# Patient Record
Sex: FEMALE
[De-identification: names, ages, dates, MRNs, and addresses within clinical notes are randomized per-mention and may not be internally consistent; named-entity substitution may affect disease eponyms.]

---

## 2019-06-11 PROBLEM — Z00.00 ENCOUNTER FOR PREVENTIVE HEALTH EXAMINATION: Status: ACTIVE | Noted: 2019-06-11

## 2019-06-12 ENCOUNTER — APPOINTMENT (OUTPATIENT)
Dept: ENDOCRINOLOGY | Facility: CLINIC | Age: 59
End: 2019-06-12
Payer: COMMERCIAL

## 2019-06-12 VITALS
DIASTOLIC BLOOD PRESSURE: 76 MMHG | HEIGHT: 63 IN | BODY MASS INDEX: 21.97 KG/M2 | SYSTOLIC BLOOD PRESSURE: 116 MMHG | WEIGHT: 124 LBS | HEART RATE: 66 BPM

## 2019-06-12 DIAGNOSIS — F17.200 NICOTINE DEPENDENCE, UNSPECIFIED, UNCOMPLICATED: ICD-10-CM

## 2019-06-12 DIAGNOSIS — Z72.0 TOBACCO USE: ICD-10-CM

## 2019-06-12 DIAGNOSIS — M80.80XA OTHER OSTEOPOROSIS WITH CURRENT PATHOLOGICAL FRACTURE, UNSPECIFIED SITE, INITIAL ENCOUNTER FOR FRACTURE: ICD-10-CM

## 2019-06-12 DIAGNOSIS — Z78.9 OTHER SPECIFIED HEALTH STATUS: ICD-10-CM

## 2019-06-12 DIAGNOSIS — E03.9 HYPOTHYROIDISM, UNSPECIFIED: ICD-10-CM

## 2019-06-12 PROCEDURE — 99205 OFFICE O/P NEW HI 60 MIN: CPT | Mod: 25

## 2019-06-12 PROCEDURE — 99407 BEHAV CHNG SMOKING > 10 MIN: CPT

## 2019-06-12 RX ORDER — NICOTINE 14 MG/24H
14 PATCH, EXTENDED RELEASE TRANSDERMAL DAILY
Qty: 3 | Refills: 3 | Status: ACTIVE | COMMUNITY
Start: 2019-06-12 | End: 1900-01-01

## 2019-06-13 PROBLEM — E03.9 ADULT HYPOTHYROIDISM: Status: ACTIVE | Noted: 2019-06-13

## 2019-06-13 PROBLEM — Z78.9 ALCOHOL CONSUMPTION ONE TO TWO DAYS PER WEEK: Status: ACTIVE | Noted: 2019-06-13

## 2019-06-13 PROBLEM — F17.200 CURRENT EVERY DAY SMOKER: Status: ACTIVE | Noted: 2019-06-13

## 2019-06-13 PROBLEM — M80.80XA OSTEOPOROSIS WITH FRACTURE: Status: ACTIVE | Noted: 2019-06-13

## 2019-06-13 RX ORDER — LEVOTHYROXINE SODIUM 125 UG/1
125 TABLET ORAL
Qty: 90 | Refills: 0 | Status: ACTIVE | COMMUNITY
Start: 2019-05-31

## 2019-06-13 RX ORDER — SIMVASTATIN 20 MG/1
20 TABLET, FILM COATED ORAL
Qty: 90 | Refills: 0 | Status: ACTIVE | COMMUNITY
Start: 2019-05-31

## 2019-06-13 RX ORDER — BUPROPION HYDROCHLORIDE 150 MG/1
150 TABLET, FILM COATED, EXTENDED RELEASE ORAL
Qty: 180 | Refills: 0 | Status: ACTIVE | COMMUNITY
Start: 2019-05-31

## 2019-06-13 NOTE — PHYSICAL EXAM
[Alert] : alert [No Acute Distress] : no acute distress [Well Nourished] : well nourished [Well Developed] : well developed [Normal Sclera/Conjunctiva] : normal sclera/conjunctiva [EOMI] : extra ocular movement intact [No Proptosis] : no proptosis [Normal Oropharynx] : the oropharynx was normal [No LAD] : no lymphadenopathy [Thyroid Not Enlarged] : the thyroid was not enlarged [No Thyroid Nodules] : there were no palpable thyroid nodules [No Respiratory Distress] : no respiratory distress [No Accessory Muscle Use] : no accessory muscle use [Clear to Auscultation] : lungs were clear to auscultation bilaterally [Normal S1, S2] : normal S1 and S2 [Normal Rate] : heart rate was normal  [Regular Rhythm] : with a regular rhythm [Pedal Pulses Normal] : the pedal pulses are present [No Edema] : there was no peripheral edema [Normal Bowel Sounds] : normal bowel sounds [Not Tender] : non-tender [Soft] : abdomen soft [Not Distended] : not distended [Post Cervical Nodes] : posterior cervical nodes [Anterior Cervical Nodes] : anterior cervical nodes [Axillary Nodes] : axillary nodes [Normal] : normal and non tender [Kyphosis] : no kyphosis present [No Spinal Tenderness] : no spinal tenderness [Scoliosis] : scoliosis not present [Spine Straight] : spine straight [No Stigmata of Cushings Syndrome] : no stigmata of cushings syndrome [Normal Gait] : normal gait [Normal Strength/Tone] : muscle strength and tone were normal [No Rash] : no rash [Acanthosis Nigricans] : no acanthosis nigricans [No Tremors] : no tremors [Normal Reflexes] : deep tendon reflexes were 2+ and symmetric [Oriented x3] : oriented to person, place, and time

## 2019-06-13 NOTE — HISTORY OF PRESENT ILLNESS
[FreeTextEntry1] : 59 y/o F w/ Hx of hypothyroidism, HLD and OP\par here for initial evaluation and manegement\par generally feels well and endorses no acute complaints.\par reports no chronic complaints. Endorses adequate energy and physical activity\par reports long standing hx of "bone issues" she used fosamax x 3 years in the early 2000s. no other treatment received. she reports no vit D intake but mostly lives in [place] and has daily sun exposure. reports adequate Ca intake w/ 3-4 items containing dairy and kale. Hx + for fragility stress fracture in the ankle in 2015. denies other fractures, maternal OP, nephrolithiasis. She otherwise denies any f/c, CP, SOB, palpitations, tremors, depressed mood, anxiety, palpitations, n/v, stool/urinary abn, skin/weight changes, heat/cold intolerance, HAs, breast/nipple changes, polyuria/polydipsia/nocturia or other complaints.\par she denies any dysphagia, hoarseness, neck tenderness or new palpable masses. she denies any family history of thyroid disorders or personal exposure to ionizing radiation.\par she reports hypothyroidism for decades, has always been on synthroid 0,125, but notes she has gradually been told to reduce, now taking 5 days of the week only.\par + 50 ppy hx, has tried bupropion, chantix, NRT w.o success. Interested in reducing amount of smoking but describes physical and psychologic dependance to cigarrettes.\par \par

## 2019-06-13 NOTE — ASSESSMENT
[FreeTextEntry1] : - Bone health:\par DEXA in the osteoporotic range. High FRAX score and hx of fragility fractures means she has severe OP, she would classify as osteoporotic and would benefit from 3-5 years of antiresorptive therapy, likely w/ PTH recomb therapy followed by sequential Zolendronic acid. She declines this approach at this time. Risks and benefits including ONJ, AF and GERD discussed at length. She verbalized understanding and she would like to continue vitamin d and dietary calcium for 2ry prevention of fractures. Labs not suggestive of secondary causes of OP. Referred to physical therapy for LE strengthening exercises and mobility assessment. \par \par Tobacco abuse\par >20 min spent discussing cessation strategies, she is ready to reduce smoking with aims to quit. start NRT (advised to not use w/ concomitant smoking) + bupropion\par \par 1) Hypothyroidism:\par Appears clinically euthyroid at this time on 125 mcg of LT4 (taking med appropriately). Reassess TFTs and need for medication titration at this time. Reviewed importance of compliance and proper intake. TSH in 3/2019 was low at 0.2, reduce LT4 to 125 4 days a week.\par \par  [Teriparatide Therapy] : Risks  and benefits of Teriparatide therapy were discussed with the patient including potential risk of osteosarcoma [Bisphosphonates] : The patient was instructed to take bisphosphonates on an empty stomach with a full glass of water,and wait at least 30 minutes before eating or lying down [Levothyroxine] : The patient was instructed to take Levothyroxine on an empty stomach, separate from vitamins, and wait at least 30 minutes before eating